# Patient Record
Sex: FEMALE | Race: BLACK OR AFRICAN AMERICAN | NOT HISPANIC OR LATINO | Employment: OTHER | URBAN - METROPOLITAN AREA
[De-identification: names, ages, dates, MRNs, and addresses within clinical notes are randomized per-mention and may not be internally consistent; named-entity substitution may affect disease eponyms.]

---

## 2022-09-28 ENCOUNTER — OFFICE VISIT (OUTPATIENT)
Dept: PODIATRY | Facility: CLINIC | Age: 82
End: 2022-09-28
Payer: MEDICARE

## 2022-09-28 VITALS — WEIGHT: 140.38 LBS | SYSTOLIC BLOOD PRESSURE: 187 MMHG | HEART RATE: 75 BPM | DIASTOLIC BLOOD PRESSURE: 82 MMHG

## 2022-09-28 DIAGNOSIS — M20.41 HAMMER TOES OF BOTH FEET: ICD-10-CM

## 2022-09-28 DIAGNOSIS — B35.1 ONYCHOMYCOSIS DUE TO DERMATOPHYTE: ICD-10-CM

## 2022-09-28 DIAGNOSIS — I73.9 PERIPHERAL VASCULAR DISEASE: Primary | ICD-10-CM

## 2022-09-28 DIAGNOSIS — M20.42 HAMMER TOES OF BOTH FEET: ICD-10-CM

## 2022-09-28 PROCEDURE — 99203 PR OFFICE/OUTPT VISIT, NEW, LEVL III, 30-44 MIN: ICD-10-PCS | Mod: 25,S$GLB,, | Performed by: PODIATRIST

## 2022-09-28 PROCEDURE — 99203 OFFICE O/P NEW LOW 30 MIN: CPT | Mod: 25,S$GLB,, | Performed by: PODIATRIST

## 2022-09-28 PROCEDURE — 11721 DEBRIDE NAIL 6 OR MORE: CPT | Mod: Q9,S$GLB,, | Performed by: PODIATRIST

## 2022-09-28 PROCEDURE — 11721 ROUTINE FOOT CARE: ICD-10-PCS | Mod: Q9,S$GLB,, | Performed by: PODIATRIST

## 2022-09-28 RX ORDER — HYDRALAZINE HYDROCHLORIDE 100 MG/1
100 TABLET, FILM COATED ORAL 3 TIMES DAILY
COMMUNITY
Start: 2022-04-11

## 2022-09-28 RX ORDER — ACETAMINOPHEN 160 MG/5ML
650 LIQUID ORAL EVERY 6 HOURS PRN
COMMUNITY
Start: 2022-03-11

## 2022-09-28 RX ORDER — BUDESONIDE AND FORMOTEROL FUMARATE DIHYDRATE 160; 4.5 UG/1; UG/1
AEROSOL RESPIRATORY (INHALATION)
COMMUNITY
Start: 2022-03-15

## 2022-09-28 RX ORDER — PANTOPRAZOLE SODIUM 40 MG/1
FOR SUSPENSION ORAL
COMMUNITY
Start: 2022-03-15

## 2022-09-28 RX ORDER — AMLODIPINE BESYLATE 5 MG/1
5 TABLET ORAL EVERY MORNING
COMMUNITY
Start: 2022-09-21

## 2022-09-28 RX ORDER — CARVEDILOL 3.12 MG/1
3.12 TABLET ORAL
COMMUNITY
Start: 2022-04-21 | End: 2022-09-28 | Stop reason: SDUPTHER

## 2022-09-28 RX ORDER — ROSUVASTATIN CALCIUM 10 MG/1
10 TABLET, COATED ORAL DAILY
COMMUNITY
Start: 2022-09-12

## 2022-09-28 RX ORDER — AMLODIPINE BESYLATE 5 MG/1
5 TABLET ORAL
COMMUNITY
Start: 2022-09-21 | End: 2022-09-28 | Stop reason: SDUPTHER

## 2022-09-28 RX ORDER — POLYETHYLENE GLYCOL 3350 17 G/17G
POWDER, FOR SOLUTION ORAL
COMMUNITY
Start: 2022-03-15

## 2022-09-28 RX ORDER — ALBUTEROL SULFATE 90 UG/1
2 AEROSOL, METERED RESPIRATORY (INHALATION) EVERY 6 HOURS PRN
COMMUNITY

## 2022-09-28 RX ORDER — CARVEDILOL 3.12 MG/1
3.12 TABLET ORAL 2 TIMES DAILY
COMMUNITY
Start: 2022-09-12

## 2022-09-28 RX ORDER — DIVALPROEX SODIUM 125 MG/1
TABLET, DELAYED RELEASE ORAL
COMMUNITY
Start: 2022-09-21 | End: 2022-09-28 | Stop reason: SDUPTHER

## 2022-09-28 RX ORDER — DIVALPROEX SODIUM 125 MG/1
125 CAPSULE, COATED PELLETS ORAL 2 TIMES DAILY
COMMUNITY
Start: 2022-09-07

## 2022-09-28 NOTE — PROGRESS NOTES
Subjective:      Patient ID: Porter Nunez is a 82 y.o. female.    Chief Complaint: Nail Problem    Porter is a 82 y.o. female who presents to the clinic for evaluation and treatment of high risk feet. Porter has a past medical history of Hypertension. The patient's chief complaint is long, thick toenails. This patient has documented high risk feet requiring routine maintenance secondary to peripheral vascular disease.     PCP: NOELLE Damon NP    Date Last Seen by PCP: 09/21/2022    Current shoe gear:  Affected Foot: Slip-on shoes     Unaffected Foot: Slip-on shoes    Last encounter in this department: Visit date not found    No results found for: HGBA1C    Review of Systems   Constitutional: Negative for chills and fever.   Cardiovascular:  Negative for chest pain and leg swelling.   Respiratory:  Negative for cough and shortness of breath.    Gastrointestinal:  Negative for diarrhea, nausea and vomiting.         Objective:      Physical Exam  Constitutional:       General: She is not in acute distress.     Appearance: Normal appearance. She is not ill-appearing.   HENT:      Head: Normocephalic.   Pulmonary:      Effort: Pulmonary effort is normal.   Skin:     Capillary Refill: Capillary refill takes 2 to 3 seconds.   Neurological:      Mental Status: She is alert and oriented to person, place, and time.   Psychiatric:         Mood and Affect: Mood normal.         Behavior: Behavior normal.         Thought Content: Thought content normal.         Judgment: Judgment normal.     Vascular: DP and PT pulses palpable 1/4 bilateral foot, capillary fill time less than 3 seconds to distal aspect of the digits bilateral foot, pedal hair growth is absent bilateral foot, skin temperature gradient warm to cool from proximal distal bilateral foot   Dermatologic:  Nails 1 through 5 bilateral are thickened elongated discolored with presence of subungual debris, no open lesions noted bilateral foot, no rashes noted  bilateral foot, pedal skin is thin shiny and atrophic   Musculoskeletal:  5/5 muscle strength noted bilateral foot, ankle joint range of motion is reduced with out pain, no masses noted bilateral foot, multiple contracted digits/hammertoes noted digits 2 through 4 bilateral foot   Neurologic:  Protective and light touch sensation intact bilateral lower extremity, positive paresthesias reported        Assessment:       No diagnosis found.      Plan:       There are no diagnoses linked to this encounter.  I counseled the patient on her conditions, their implications and medical management.        1. Patient was examined and evaluated  2. Patient was advised to monitor feet on a daily basis.  Patient will elevate lower extremity at rest for any swelling.  Patient will continue active passive range of motion of the foot   3. Discussed etiology of fungal nail changes with the patient.  Topical conservative OTC treatments were discussed in detail  Routine Foot Care    Date/Time: 9/28/2022 8:00 AM  Performed by: Danya Vaughn DPM  Authorized by: Danya Vaughn DPM     Consent Done?:  Yes (Verbal)  Hyperkeratotic Skin Lesions?: No      Nail Care Type:  Debride  Location(s): All  (Left 1st Toe, Left 3rd Toe, Left 2nd Toe, Left 4th Toe, Left 5th Toe, Right 1st Toe, Right 2nd Toe, Right 3rd Toe, Right 4th Toe and Right 5th Toe)  Patient tolerance:  Patient tolerated the procedure well with no immediate complications   4. Discussed etiology of hammertoe deformities with the patient.  Discussed conservative treatment options including increased toe box height and width of the shoe gear selection of shoe gear with soft stretchable uppers.   5. Patient will follow-up in 4 months or p.r.n. for complaints